# Patient Record
Sex: MALE
[De-identification: names, ages, dates, MRNs, and addresses within clinical notes are randomized per-mention and may not be internally consistent; named-entity substitution may affect disease eponyms.]

---

## 2021-10-01 ENCOUNTER — TRANSCRIPTION ENCOUNTER (OUTPATIENT)
Age: 41
End: 2021-10-01

## 2022-02-11 ENCOUNTER — TRANSCRIPTION ENCOUNTER (OUTPATIENT)
Age: 42
End: 2022-02-11

## 2022-03-14 ENCOUNTER — NON-APPOINTMENT (OUTPATIENT)
Age: 42
End: 2022-03-14

## 2022-03-15 PROBLEM — Z00.00 ENCOUNTER FOR PREVENTIVE HEALTH EXAMINATION: Status: ACTIVE | Noted: 2022-03-15

## 2022-03-16 ENCOUNTER — NON-APPOINTMENT (OUTPATIENT)
Age: 42
End: 2022-03-16

## 2022-03-16 ENCOUNTER — APPOINTMENT (OUTPATIENT)
Dept: NEUROSURGERY | Facility: CLINIC | Age: 42
End: 2022-03-16

## 2022-03-28 ENCOUNTER — APPOINTMENT (OUTPATIENT)
Dept: NEUROSURGERY | Facility: CLINIC | Age: 42
End: 2022-03-28
Payer: SELF-PAY

## 2022-03-28 DIAGNOSIS — G90.511 COMPLEX REGIONAL PAIN SYNDROME I OF RIGHT UPPER LIMB: ICD-10-CM

## 2022-03-28 PROCEDURE — 99443: CPT

## 2022-04-01 ENCOUNTER — TRANSCRIPTION ENCOUNTER (OUTPATIENT)
Age: 42
End: 2022-04-01

## 2022-04-04 PROBLEM — G90.511 COMPLEX REGIONAL PAIN SYNDROME TYPE 1 OF RIGHT UPPER EXTREMITY: Status: ACTIVE | Noted: 2022-04-04

## 2022-04-04 NOTE — ASSESSMENT
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 he has failed conservative management and therefore underwent SCS trial in the cervical spne ([employer]). He is undergoing implantation with Dr. LI. I explained to him that I would be present to assist Dr. LI with the implantation as needed. We discussed the risk, benefits and alternatives including but not limited to bleeding, infection, CSF leak, nerve damage, spinal cord injury, lead migration, hardware failure, lack of pain relief, need for further procedures including revision removal. \par \par All questions were answered and he would like to proceed.

## 2022-04-04 NOTE — HISTORY OF PRESENT ILLNESS
[Home] : at home, [unfilled] , at the time of the visit. [Medical Office: ([facility])___] : at the medical office located in  [Verbal consent obtained from patient] : the patient, [unfilled] [FreeTextEntry1] : right hand injury [de-identified] : I am seeing Madi at the request of my colleague Dr. LI for right hand CRPS. He had a workplace injury of the hand more than two years ago and has been diagnosed with CRPS of the right hand based on skin changes, allodynia, contractures. He has limited use of the right hand. He has failed conservative measure including topical creams, medications, PT and injections. He underwent trial of cervical SCS with more than 60% improvement of his right hand symptoms and improved functionality. \par \par He has been referred to see me such that I may assist Dr. LI with his SCS implant if needed in the OR.

## 2022-04-26 ENCOUNTER — NON-APPOINTMENT (OUTPATIENT)
Age: 42
End: 2022-04-26